# Patient Record
(demographics unavailable — no encounter records)

---

## 2017-01-17 NOTE — PAT MEDICATION INSTRUCTIONS
Service Date


Jan 17, 2017.





Current Home Medication List


Atorvastatin (Lipitor), 10 MG PO HS


B-Complex Vitamins (Vitamin B Complex), 1 TAB PO QAM


Calcium Carbonate-Vitamin D (Calcium + D), 1 TAB PO QAM


Cholecalciferol (Vitamin D), 4,000 INTER.UNIT PO QAM


Lisinopril (Prinivil), 10 MG PO QAM


Lorazepam (Ativan), 0.5 MG PO TID PRN for Anxiety


Metformin Hcl (Glucophage), 500 MG PO BID


Multivitamin (Multivitamin), 1 TAB PO QAM


Omeprazole (Prilosec), 20 MG PO HS


Verapamil Sust Rel (Calan Sr Ext Rel), 240 MG PO QAM


Vitamins C & E (Vitamin C), 1 CAP PO HS





Medication Instructions


For Your Scheduled Surgery 





- Hold the following medications 48 hours prior to surgery:


Metformin Hcl (Glucophage), 500 MG PO BID





- Hold the following medications the morning of surgery:


Multivitamin (Multivitamin), 1 TAB PO QAM


Lisinopril (Prinivil), 10 MG PO QAM


Calcium Carbonate-Vitamin D (Calcium + D), 1 TAB PO QAM


Cholecalciferol (Vitamin D), 4,000 INTER.UNIT PO QAM


B-Complex Vitamins (Vitamin B Complex), 1 TAB PO QAM





- Take the following medications the morning of surgery with a sip of water:


Verapamil Sust Rel (Calan Sr Ext Rel), 240 MG PO QAM


Lorazepam (Ativan), 0.5 MG PO TID PRN for Anxiety





- Take the following medications as scheduled the night before surgery:


Omeprazole (Prilosec), 20 MG PO HS


Lorazepam (Ativan), 0.5 MG PO TID PRN for Anxiety


Vitamins C (Vitamin C), 1 CAP PO HS


Atorvastatin (Lipitor), 10 MG PO HS





If you have any questions please call us at [phone] (Isha Salazar PA-C)

 or [phone] or [phone]

## 2017-01-23 NOTE — [facility] POST OPERATIVE BRIEF NOTE
Immediate Operative Summary


Operative Date


Jan 23, 2017.





Pre-Operative Diagnosis





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





Post-Operative Diagnosis





Same





Procedure(s) Performed





Bilateral Mastectomy and Left [place] Lymph Node Biopsy - Dr. Xavier 


Bilateral Breast Reconstruction with Adjustable Implants and Accellular Dermal 


Matrix - Dr. Pollard





Surgeon


Dr Xavier/ Dr Pollard





Assistant Surgeon(s)


Althea Costa PA-C





Estimated Blood Loss


50ML





Findings


See dictation





Specimens





A. Left breast stitch is lateral





B. [place] lymph node #1





C. [place] lymph node #2





D. [place] lymph node #3





E. Right breast stitch is lateral





Drains


4 J-P drains ( 2 per side)





Anesthesia


General





Complication(s)


None





Disposition


Recovery Room / PACU

## 2017-01-23 NOTE — DIAGNOSTIC IMAGING REPORT
LYMPHOSCINTIGRAPHY



CLINICAL HISTORY: Left breast cancer.



PROCEDURE: Using standard sterile technique, 4 intradermal and one deep

injection of 0.479 mCi of Lymphoseek was placed in the left breast. The patient

tolerated the procedure well. There were no immediate complications. The patient

was subsequently transported to the surgical suite. No imaging was obtained at

the referring physician's request. 



IMPRESSION: Injection of 0.479 mCi of Lymphoseek in the left breast.  







Electronically signed by:  Jarvis Dunn M.D.

1/23/2017 4:21 PM



Dictated Date/Time:  1/23/2017 4:20 PM

## 2017-01-23 NOTE — OPERATIVE REPORT
DATE OF OPERATION:  01/23/2017

 

PREOPERATIVE DIAGNOSIS:  Ductal carcinoma in situ left breast.

 

POSTOPERATIVE DIAGNOSIS:  Same.

 

PROCEDURE:  Stage immediate reconstruction following bilateral mastectomies

with tissue expanders and allograft.

 

SURGEON:  Rudy Pollard MD

 

ASSISTANTS:  Althea Costa PA-C and Ms. Wright, physician assistant student

student.

 

ANESTHESIA:  General endotracheal.

 

ESTIMATED BLOOD LOSS:  For plastic surgery, approximately 50 mL.

 

INTRAVENOUS FLUIDS AND URINE:  Refer to Dr. Xavier's note.

 

SPECIMENS:  None for plastic surgery.

 

APPARENT INTRAOPERATIVE COMPLICATIONS:  None.

 

CONDITION:  The patient's condition good.

 

DRAINS:  Two #10 flat Naveed-Avendano drains, one in the submuscular allograft

pocket via the subcutaneous.

 

HISTORY OF PRESENT ILLNESS:  The patient is a 52-year-old female referred by

Dr. Xavier for consultation for reconstruction.  She has DCIS extensively

on the left side.  She has elected to undergo bilateral mastectomy.  She is

now brought to the operating room for that purpose.  We will reconstruct her

with tissue expanders and allograft.  Dr. Xavier will perform bilateral

mastectomies and a sentinel node biopsy of the left side.

 

DESCRIPTION OF PROCEDURE:  In the preoperative holding area, I marked the

patient with her standing.  I marked the inframammary creases bilaterally. 

The anterior axillary lines were marked.  The patient was brought to the

operating room, where she was placed under general endotracheal anesthesia. 

The chest area was prepped and draped.  The [place] catheter was inserted.  A

timeout was held at the initial part of the procedure and I repeated this

during my part of the procedure.  Dr. Xavier and his team then proceeded

to perform the left mastectomy and sentinel node biopsies.  Upon completion,

I had the plastic surgery team entered the field.  Procedure commenced by

defining the inframammary crease.  Markings were made with a pen along the

inframammary crease.  After the subcutaneous tissue had been dissected off

the chest wall, which corresponded to the overlying inframammary crease

markings placed externally on the skin.  The edge of the soft tissue

internally was also marked to correspond to the inframammary crease.  A

submuscular pocket was then dissected underneath the pectoralis major muscle.

 It was  from the minor laterally.  The medial origin was taken down

from 6 o'clock to 9 o'clock.  There was a tear and the muscles were repaired

with interrupted 2-0 Vicryl sutures.  After this, a [place] CPX4 Tall Height

tissue expander was passed up the field.  On the left side, we used reference

#[id number], lot #[id number], serial #[id number].  It was tested for air leaks. 

Air was evacuated from the lumen.  Marker links were then made corresponding

with the tabs especially the one at 6 o'clock.  Next, a piece of AlloMax

surgical graft was brought in the field that had been soaked in 3 separate

bags of saline for 5 minutes each prior to use.  It was properly oriented

with a smooth side against the inside of the pocket and the rough side oriented 
towards the breast flap. The allograft was sutured into position by placing 2-0 
vicryl sutures medially suturing it to the pectoralis muscle and laterally to 
the pectoralis mucles and the chest wall.  Two sutures were then placed around 
the 6 o'clock position to either side where the

tab was to be placed.  In suturing along the inframammary crease, bites were

then taken to the allograft, the chest wall, and through the subcutaneous

tissue corresponding to the inframammary crease.  Excess tissue was then

trimmed at approximately 4 and 8 o'clock and more interrupted Vicryls were

placed.  This was then followed by running 2-0 Vicryls to either side of the

6 o'clock position again incorporating the inframammary crease were

appropriate and the anterior axillary line.  The interrupted 2-0 Vicryls were

then placed through the subcutaneous tissue and under the allograft through

the suture tab and then brought out.  This was done at 3, 6 and 9 o'clock. 

The expander was then carefully brought into the pocket and the pocket was

closed with running 2-0 Vicryl sutures, one starting medial plummer towards the

midline and the other lateral plummer towards the midline.  The pocket was then

inspected for bleeding and a #10 flat Naveed-Avendano drain had been placed

into the pocket and brought out through the stab wound in the anterior

axillary line.  A second drain was then placed inferior to the original one

and placed in the subcutaneous pocket along the inframammary crease.  Again

after hemostasis was achieved, the wound was irrigated with a triple

antibiotic solution consisting of cephalosporin, gentamicin, and bacitracin. 

Skin was then closed with buried 3-0 Monocryl dermal sutures, a running 3-0

Monocryl subcuticular suture, and Dermabond.  Drains were secured with 3-0

nylon sutures.  A similar procedure was carried out on the contralateral

side.  The allograft used was lot #[id number], reference #[id number], serial

#[id number], expiration date 2021-09.  The expander used was reference

#[id number], lot #[id number], serial #[id number].  The allograft used on the left

side was lot #[id number], reference #[id number], serial #[id number] expiration

date, 2021-09 .

 

 

I attest to the content of the Intraoperative Record and any orders documented 
therein. Any exceptions are noted below.

 

 Rudy Pollard MD FACS

 

[facility]

## 2017-01-23 NOTE — ANESTHESIOLOGY PROGRESS NOTE
Anesthesia Post Op Note


Date & Time


Jan 23, 2017 at 17:25





Vital Signs


Pain Intensity:  3





 Vital Signs Past 12 Hours








  Date Time  Temp Pulse Resp B/P Pulse Ox O2 Delivery O2 Flow Rate FiO2


 


1/23/17 17:14  71 13     


 


1/23/17 17:14  70 13  98   


 


1/23/17 17:13    134/77    


 


1/23/17 17:09  80 12     


 


1/23/17 17:09  80 12  98   


 


1/23/17 17:08    135/75    


 


1/23/17 17:04  69 10     


 


1/23/17 17:04  69 10  100   


 


1/23/17 17:03    137/71    


 


1/23/17 16:59  69 12  100   


 


1/23/17 16:59  70 12     


 


1/23/17 16:58    131/71    


 


1/23/17 16:54  73 13  100   


 


1/23/17 16:54  73 13     


 


1/23/17 16:53    130/74    


 


1/23/17 16:49  80 9  100   


 


1/23/17 16:49  80 9     


 


1/23/17 16:48    139/78    


 


1/23/17 16:44  84 11  100   


 


1/23/17 16:44  85 11     


 


1/23/17 16:43    143/79    


 


1/23/17 16:39  73 14     


 


1/23/17 16:39  79 14  100   


 


1/23/17 16:38    137/77    


 


1/23/17 16:34  68 15  100   


 


1/23/17 16:34  69 15     


 


1/23/17 16:33    137/76    


 


1/23/17 16:29  67 15  100   


 


1/23/17 16:29  67 15     


 


1/23/17 16:29  67 15  100   


 


1/23/17 16:29  67 15  100   


 


1/23/17 16:29  67 15     


 


1/23/17 16:29  67 15     


 


1/23/17 16:28    145/80    


 


1/23/17 16:28    145/80    


 


1/23/17 16:28    145/80    


 


1/23/17 16:24  69 14  100   


 


1/23/17 16:24  68 14     


 


1/23/17 16:24  69 14  100   


 


1/23/17 16:24  68 14     


 


1/23/17 16:24  69 14  100   


 


1/23/17 16:24  68 14     


 


1/23/17 16:23    140/78    


 


1/23/17 16:23    140/78    


 


1/23/17 16:23    140/78    


 


1/23/17 16:19  68 15     


 


1/23/17 16:19  69 15  100   


 


1/23/17 16:19  69 15  100   


 


1/23/17 16:19  68 15     


 


1/23/17 16:19  68 15     


 


1/23/17 16:19  69 15  100   


 


1/23/17 16:18    146/82    


 


1/23/17 16:18    146/82    


 


1/23/17 16:18    146/82    


 


1/23/17 16:14  80 15     


 


1/23/17 16:14  80 15     


 


1/23/17 16:14  81 15  100   


 


1/23/17 16:14  81 15  100   


 


1/23/17 16:14  81 15  100   


 


1/23/17 16:14  80 15     


 


1/23/17 16:13    143/83    


 


1/23/17 16:13    143/83    


 


1/23/17 16:13    143/83    


 


1/23/17 16:09  81 16 139/78 100 Mask 10 


 


1/23/17 16:09  95 15  100   


 


1/23/17 16:09  95 15  100   


 


1/23/17 16:09  76 15     


 


1/23/17 16:09  76 15     


 


1/23/17 16:09  76 15     


 


1/23/17 16:09  95 15  100   


 


1/23/17 16:08    148/77    


 


1/23/17 16:08    148/77    


 


1/23/17 16:08    148/77    


 


1/23/17 16:04  79 14     


 


1/23/17 16:04  79 14  100   


 


1/23/17 16:04  79 14     


 


1/23/17 16:04  79 14     


 


1/23/17 16:04  79 14  100   


 


1/23/17 16:04  79 14  100   


 


1/23/17 16:03    139/78    


 


1/23/17 16:03    139/78    


 


1/23/17 16:03    139/78    


 


1/23/17 15:59  84 16  100   


 


1/23/17 15:59  84 16     


 


1/23/17 15:59  84 16     


 


1/23/17 15:59  84 16  100   


 


1/23/17 15:59  84 16     


 


1/23/17 15:59 36.6 84 16 134/81 100 Mask 10 


 


1/23/17 15:59  84 16  100   


 


1/23/17 07:54 37 75 16 120/78 96 Room Air  











Notes


Mental Status:  alert / awake / arousable, participated in evaluation


Pt Amnestic to Procedure:  Yes


Nausea / Vomiting:  adequately controlled


Pain:  adequately controlled


Airway Patency, RR, SpO2:  stable & adequate


BP & HR:  stable & adequate


Hydration State:  stable & adequate


Anesthetic Complications:  no major complications apparent

## 2017-01-24 NOTE — SURGERY PROGRESS NOTE
Surgery Progress Note


Date of Service


Jan 24, 2017.





Subjective


Post OP Day:  1


No nausea, No vomiting


Having difficulty initiating urine stream





Objective


Vital Signs:











  Date Time  Temp Pulse Resp B/P Pulse Ox O2 Delivery O2 Flow Rate FiO2


 


1/24/17 11:42 36.7 78 16 138/75 95 Room Air  


 


1/24/17 08:59      Room Air  


 


1/24/17 08:00      Room Air  


 


1/24/17 07:41 37.5 78 16 143/85 96 Room Air  


 


1/24/17 02:54 37.0 73 16 168/76 96 Room Air  


 


1/24/17 00:14      Nasal Cannula 2.0 


 


1/23/17 23:12 36.8 74 16 127/66 99 Nasal Cannula 2.0 


 


1/23/17 21:38 36.9 83 18 146/87 100 Nasal Cannula 2.0 





      Humidified Oxygen  


 


1/23/17 20:18  72 16 128/83 99 Nasal Cannula 3.0 


 


1/23/17 19:30     100 Nasal Cannula 2.0 


 


1/23/17 19:11 36.7 72 16 127/83 95 Nasal Cannula 3.0 


 


1/23/17 18:32 36.6 72 16 131/82 98 Nasal Cannula 2.0 


 


1/23/17 18:00      Nasal Cannula 2.0 


 


1/23/17 17:33    127/80    


 


1/23/17 17:32 37.1 76 16 127/80 97 Nasal Cannula 2 


 


1/23/17 17:30  77 13  98   


 


1/23/17 17:30  77 13     


 


1/23/17 17:28    136/74    


 


1/23/17 17:25  87 10  97   


 


1/23/17 17:25  87 10     


 


1/23/17 17:23    140/87    


 


1/23/17 17:20  74 14  98   


 


1/23/17 17:20  74 14     


 


1/23/17 17:18    129/74    


 


1/23/17 17:15  86 11  97   


 


1/23/17 17:15  86 11     


 


1/23/17 17:14  71 13     


 


1/23/17 17:14  70 13  98   


 


1/23/17 17:13    134/77    


 


1/23/17 17:09  80 12     


 


1/23/17 17:09  80 12  98   


 


1/23/17 17:08    135/75    


 


1/23/17 17:04  69 10     


 


1/23/17 17:04  69 10  100   


 


1/23/17 17:03    137/71    


 


1/23/17 16:59  69 12  100   


 


1/23/17 16:59  70 12     


 


1/23/17 16:58    131/71    


 


1/23/17 16:54  73 13  100   


 


1/23/17 16:54  73 13     


 


1/23/17 16:53    130/74    


 


1/23/17 16:49  80 9  100   


 


1/23/17 16:49  80 9     


 


1/23/17 16:48    139/78    


 


1/23/17 16:44  84 11  100   


 


1/23/17 16:44  85 11     


 


1/23/17 16:43    143/79    


 


1/23/17 16:39  73 14     


 


1/23/17 16:39  79 14  100   


 


1/23/17 16:38    137/77    


 


1/23/17 16:34  68 15  100   


 


1/23/17 16:34  69 15     


 


1/23/17 16:33    137/76    


 


1/23/17 16:29  67 15  100   


 


1/23/17 16:29  67 15     


 


1/23/17 16:29  67 15  100   


 


1/23/17 16:29  67 15  100   


 


1/23/17 16:29  67 15     


 


1/23/17 16:29  67 15     


 


1/23/17 16:28    145/80    


 


1/23/17 16:28    145/80    


 


1/23/17 16:28    145/80    


 


1/23/17 16:24  69 14  100   


 


1/23/17 16:24  68 14     


 


1/23/17 16:24  69 14  100   


 


1/23/17 16:24  68 14     


 


1/23/17 16:24  69 14  100   


 


1/23/17 16:24  68 14     


 


1/23/17 16:23    140/78    


 


1/23/17 16:23    140/78    


 


1/23/17 16:23    140/78    


 


1/23/17 16:19  68 15     


 


1/23/17 16:19  69 15  100   


 


1/23/17 16:19  69 15  100   


 


1/23/17 16:19  68 15     


 


1/23/17 16:19  68 15     


 


1/23/17 16:19  69 15  100   


 


1/23/17 16:18    146/82    


 


1/23/17 16:18    146/82    


 


1/23/17 16:18    146/82    


 


1/23/17 16:14  80 15     


 


1/23/17 16:14  80 15     


 


1/23/17 16:14  81 15  100   


 


1/23/17 16:14  81 15  100   


 


1/23/17 16:14  81 15  100   


 


1/23/17 16:14  80 15     


 


1/23/17 16:13    143/83    


 


1/23/17 16:13    143/83    


 


1/23/17 16:13    143/83    


 


1/23/17 16:09  81 16 139/78 100 Mask 10 


 


1/23/17 16:09  95 15  100   


 


1/23/17 16:09  95 15  100   


 


1/23/17 16:09  76 15     


 


1/23/17 16:09  76 15     


 


1/23/17 16:09  76 15     


 


1/23/17 16:09  95 15  100   


 


1/23/17 16:08    148/77    


 


1/23/17 16:08    148/77    


 


1/23/17 16:08    148/77    


 


1/23/17 16:04  79 14     


 


1/23/17 16:04  79 14  100   


 


1/23/17 16:04  79 14     


 


1/23/17 16:04  79 14     


 


1/23/17 16:04  79 14  100   


 


1/23/17 16:04  79 14  100   


 


1/23/17 16:03    139/78    


 


1/23/17 16:03    139/78    


 


1/23/17 16:03    139/78    


 


1/23/17 15:59  84 16  100   


 


1/23/17 15:59  84 16     


 


1/23/17 15:59  84 16     


 


1/23/17 15:59  84 16  100   


 


1/23/17 15:59  84 16     


 


1/23/17 15:59 36.6 84 16 134/81 100 Mask 10 


 


1/23/17 15:59  84 16  100   








Physical Exam:  TENZIN drainage (10-40 cc per drain last shift, serosnaguinous)


Abdomen:  no pulsatile mass


Incision(s):  dry, intact, no erythema, no drainage, findings (No evidence of 

hematoma)


Laboratory Results:





Results Past 24 Hours








Test


  1/23/17


16:03 1/24/17


08:32 Range/Units


 


 


Bedside Glucose 121 150 70-90  mg/dl











Assessment & Plan


S/P bilateral mastectomy with reconstruction


Pain 5-9, fairly well conrolled


Would keep patient at [facility] more day for pain control and for difficulty 

voiding

## 2017-01-24 NOTE — OPERATIVE REPORT
DATE OF OPERATION:  01/23/2017

 

PREOPERATIVE DIAGNOSIS:  Extensive DCIS, left breast.

 

POSTOPERATIVE DIAGNOSIS:  Same.

 

PROCEDURE:  Bilateral mastectomy with left axillary sentinel lymph node

biopsy as well as bilateral reconstruction.  The reconstruction portion will

be dictated by Dr. Pollard.

 

FINDINGS:  The biopsy cavities were not entered.  There was no gross mass on

either side.  There were 3 sentinel lymph nodes that were biopsied.  The

first sentinel lymph node had an in-vivo count of 84 with an ex-vivo count of

654.  The sentinel lymph node #2 had an in-vivo count of 111 with an ex-vivo

count of 208 and sentinel lymph node #3 had an in-vivo count of 65 with an

ex-vivo count of 142.  None of the sentinel lymph nodes was blue.

 

TECHNIQUE:  The patient had the breasts marked in the holding area.  She was

then taken to the operating room and placed on the operating table,

positioned and the area was prepped and draped in the usual sterile fashion. 

The Neoprobe was used to identify the fact that a sentinel node was present

in the left axilla.  The incisions had been previously marked.  The superior

portion of the elliptical incision on the left breast was made, carried down

through to the subcutaneous tissue.  The superior flap was then created,

using cautery and dissecting the breast tissue off the overlying subcutaneous

tissue up to the level of the clavicle.  This was dissected then from medial

to lateral, working from the lateral border of the sternum out to beyond the

lateral border of pectoralis major.  The inferior portion of the incision was

created and carried down through to the subcutaneous tissue and an inferior

flap was created in a similar fashion down to the superior aspect of the

rectus fascia.  Once that was accomplished, I then performed the lateral

dissection, which then allowed me to enter the left axilla.  The Neoprobe was

then used to identify the 3 sentinel nodes.  They were dissected and once

they were identified, they were  from the surrounding tissues, using

the LigaSure device.  The baseline counts were then 56 as the highest.  The

mastectomy was then completed.  Beginning on the medial aspect, the

pectoralis major fascia was opened and then working along the superior edge

of the dissection of the skin flap, it was opened there, as well, exposing

the anterior surface of pectoralis major.  I then performed a similar

transection of the fascia inferiorly and then peeled the fascia off the

underlying pectoralis major muscle.  This was performed, working medial to

lateral.  Hemostasis was obtained, all times using cautery or using the

LigaSure for perforating vessels.  Once the pectoralis major lateral border

was cleared, I then completed the lateral dissection and removed the breast. 

The breast was marked with a stitch on the lateral corner of the skin. 

Meticulous hemostasis was then obtained.  The reconstruction on that side was

performed by Dr. Pollard, who will dictate that portion of the procedure. 

Attention was then turned to the right breast.  The elliptical incision had

been created.  It was the superior portion of that elliptical incision. 

Incision was made, carried down to the subcutaneous tissue and a superior

skin flap was created, maintaining adequate thickness, but dissecting along

the border of the subcutaneous tissue up to the clavicle, working towards the

lateral border of pectoralis major as well as medial, towards the lateral

border of the sternum.  The inferior portion of the incision was then made

and carried down to the subcutaneous tissue and an inferior flap was then

created down to beyond the inferior breast crease to the rectus fascia.  The

pectoralis major fascia was divided along the superior edge of the superior

skin flap and then, that dissection was carried medially and then inferiorly

along the sternal border and then the pectoralis major was peeled off the

underlying pectoralis major muscle, completing the transection of the fascia

on the inferior skin flap edge as well.  Once the lateral border of the

pectoralis major was freed, the lateral dissection was carried out to assure

inclusion of the tail of the breast on that side as well and removed.  It was

marked with a stitch on the lateral corner of the skin.  Hemostasis was

obtained.  That breast was then reconstructed and that will be dictated by

Dr. Pollard.

 

Estimated blood loss was 50 mL.  Sponge, needle, and instrument counts were

correct prior to closure.  The patient tolerated surgical procedure without

complication and was transferred to recovery.

 

 

I attest to the content of the Intraoperative Record and any orders documented therein. Any exceptio
ns are noted below.

## 2017-01-24 NOTE — ANESTHESIOLOGY PROGRESS NOTE
Anesthesia Post Op Note


Date & Time


Jan 24, 2017 at 09:59


 


 (Dwayne Underwood CRNA)





Vital Signs





 Vital Signs Past 12 Hours








  Date Time  Temp Pulse Resp B/P Pulse Ox O2 Delivery O2 Flow Rate FiO2


 


1/24/17 08:59      Room Air  


 


1/24/17 07:41 37.5 78 16 143/85 96 Room Air  


 


1/24/17 02:54 37.0 73 16 168/76 96 Room Air  


 


1/24/17 00:14      Nasal Cannula 2.0 


 


1/23/17 23:12 36.8 74 16 127/66 99 Nasal Cannula 2.0 








 (Yasmine, Ross E., CRNA)





Notes


Mental Status:  alert / awake / arousable, participated in evaluation


Pt Amnestic to Procedure:  Yes


Nausea / Vomiting:  adequately controlled


Pain:  adequately controlled


Airway Patency, RR, SpO2:  stable & adequate


BP & HR:  stable & adequate


Hydration State:  stable & adequate


Anesthetic Complications:  no major complications apparent


 (Dwayne Underwood CRNA)


        The patient feels well today other than some discomfort at the surgical 

site.  She states that her R hand tingling has completely resolved. She has no 

complaints and stated that she has been very happy with her care. 


 (Yarelis Ash MD)

## 2017-01-25 NOTE — SURGERY PROGRESS NOTE
Surgery Progress Note


Date of Service


Jan 25, 2017.





Subjective


Post OP Day:  2


+ diet, No bowel movement, No nausea, No vomiting


Still having pain, was still unable to void yesterday, Menon placed





Objective


Vital Signs:











  Date Time  Temp Pulse Resp B/P Pulse Ox O2 Delivery O2 Flow Rate FiO2


 


1/24/17 23:00 37.3 75 16 146/66 98 Nasal Cannula 2.0 





      Humidified Oxygen  


 


1/24/17 22:45      Nasal Cannula 2.0 


 


1/24/17 19:45      Nasal Cannula 2.0 


 


1/24/17 15:48 37.0 90 18 107/66 98 Nasal Cannula 2.0 


 


1/24/17 11:42 36.7 78 16 138/75 95 Room Air  


 


1/24/17 08:59      Room Air  


 


1/24/17 08:00      Room Air  


 


1/24/17 07:41 37.5 78 16 143/85 96 Room Air  








Incision(s):  clean, dry, intact, no erythema, no drainage


Laboratory Results:





Results Past 24 Hours








Test


  1/24/17


08:32 Range/Units


 


 


Bedside Glucose 150 70-90  mg/dl











Assessment & Plan


S/P bilateral mastectomy with reconstruction


Pain continues, will increase pain medication


Would keep patient at least one more day for pain control and for difficulty 

voiding


Needs continuing antibiotics while drains are in place

## 2017-01-26 NOTE — SURGERY PROGRESS NOTE
Surgery Progress Note


Date of Service


Jan 26, 2017.





Subjective


+ diet (tolerating diet), + pain controlled (A little less today), No bowel 

movement (despite MOM), No nausea, No vomiting





Objective


Vital Signs:











  Date Time  Temp Pulse Resp B/P Pulse Ox O2 Delivery O2 Flow Rate FiO2


 


1/26/17 15:49 36.6 71 16 140/84 94 Room Air  


 


1/26/17 08:25 36.8 81 16 115/72 92 Room Air  


 


1/26/17 08:00     97 Room Air  


 


1/25/17 23:45      Room Air  


 


1/25/17 23:40 36.8 87 18 143/77 91 Room Air  








Physical Exam:  TENZIN drainage (15-95 cc per day, serous)


Incision(s):  clean, dry, intact, no erythema, no drainage


Laboratory Results:





Results Past 24 Hours








Test


  1/26/17


08:32 Range/Units


 


 


Bedside Glucose 123 70-90  mg/dl











Assessment & Plan


S/P bilateral mastectomy with reconstruction


Pain improved a little today


Would keep patient at least one more day for pain control, difficulty voiding 

and constipation


Will order suppository and Miralax


Needs continuing antibiotics while drains are in place

## 2017-01-26 NOTE — DISCHARGE INSTRUCTIONS
Discharge Instructions


Admission


Reason for Admission:  Left Breast Ductal Carcinoma In Situ





Discharge


Discharge Diagnosis / Problem:  Same





Discharge Goals


Goal(s):  Improve disease control





Activity Recommendations


Activity Limitations:  per Instructions/Follow-up section


Lifting Limitations:  no more than 10 pounds


Shower/Bathe:  keep incision dry (drain sites)





.





Instructions / Follow-Up


Instructions / Follow-Up


Follow plastic surgery instructions





Empty and record drains





Place antibiotic ointment to drain sites





Current Hospital Diet


Patient's current hospital diet: Regular Diet, Diabetes Type 2 Diet





Discharge Diet


Recommended Diet:  Diabetes Type 2 Diet





Procedures


Procedures Performed:  


Bilateral Mastectomy and Left [place] Lymph Node Biopsy - Dr. Xavier 


Bilateral Breast Reconstruction with Adjustable Implants and Accellular Dermal 


Matrix - Dr. Pollard





Pending Studies


Studies pending at discharge:  no





Medical Emergencies








.


Who to Call and When:





Medical Emergencies:  If at any time you feel your situation is an emergency, 

please call 911 immediately.





.





Non-Emergent Contact


Non-Emergency issues call your:  Primary Care Provider, Surgeon


Call Non-Emergent contact if:  your pain is worsening, your pain is unusual for 

you, wound has increased redness, wound has increased pain


.





"Provider Documentation" section prepared by Neil Xavier.





VTE Core Measure


Inpt VTE Proph given/why not?:  Treatment not indicated

## 2017-01-27 NOTE — SURGERY PROGRESS NOTE
Surgery Progress Note


Date of Service


Jan 27, 2017.





Subjective


Post OP Day:  4


+ bowel movement, + flatus, No nausea, No vomiting


Feeling better 


Less pain today





Objective


Vital Signs:











  Date Time  Temp Pulse Resp B/P Pulse Ox O2 Delivery O2 Flow Rate FiO2


 


1/27/17 07:24 36.7 71 17 129/83 93 Room Air  


 


1/26/17 23:50 36.8 75 16 138/78 90 Room Air  


 


1/26/17 19:25      Room Air  


 


1/26/17 15:49 36.6 71 16 140/84 94 Room Air  


 


1/26/17 08:25 36.8 81 16 115/72 92 Room Air  


 


1/26/17 08:00     97 Room Air  








Physical Exam:  TENZIN drainage (8-50 cc per shift)


Abdomen:  non tender, non distended, soft


Incision(s):  clean, dry, intact, no erythema, no drainage


Laboratory Results:





Results Past 24 Hours








Test


  1/26/17


08:32 Range/Units


 


 


Bedside Glucose 123 70-90  mg/dl











Assessment & Plan


S/P bilateral mastectomy with reconstruction


Pain improved much more today


Had bm


Will D/C Tubbs, if patient still unable to void can replace Tubbs for patient 

to return home with


Can D/C to home, follow up next week


Needs continuing antibiotics while drains are in place

## 2017-01-30 NOTE — DISCHARGE SUMMARY
PRINCIPAL DIAGNOSIS:  Ductal carcinoma in situ, left breast.  Pathology on

the specimen remains pending.

 

SECONDARY DIAGNOSES:  Dyslipidemia, gastroesophageal reflux disease,

hypertension and diabetes type 2.

 

PRINCIPAL PROCEDURE:  Bilateral mastectomy with left axillary sentinel lymph

node biopsy and bilateral reconstruction using tissue expanders and

allograft.

 

SECONDARY PROCEDURES:  None.

 

CONSULTATIONS:  None.

 

HISTORY AND PHYSICAL:  As per H\T\P on the chart with no additions or

deletions.  Briefly, this is a 52-year-old female who had calcifications in

the upper outer quadrant, for which she underwent initially a core biopsy and

then an excisional needle localized biopsy, followed by a reexcision. 

Diagnosis on pathology was DCIS that was fairly extensive.  Decision was made

to perform mastectomy.  The patient then chose for bilateral mastectomy.  She

did have a biopsy on the right side but for benign disease.

 

HOSPITAL COURSE:  She was taken to the operating room where the procedure

described above was performed.  The estimated blood loss was 50 mL. Four

Naveed-Avendano drains were placed.  She had significant discomfort that

required analgesia control; however, by postoperative day #4, she was feeling

much better and pain was under good control with oral analgesics.  The drains

were passing varying amounts of serosanguineous fluid.  There was no evidence

of wound infection or seroma or hematoma.  She also was having difficulty

with constipation and with passing her urine.  The constipation was treated

with varying regimens of cathartics and by postoperative day #3, she did have

a bowel movement.  She had the Menon catheter removed in the immediate

postoperative period and required straight catheterization.  A Menon was then

placed and her urine output was good.  The Menon was removed on the morning

of discharge and the patient voided spontaneously prior to discharge.  Her

discharge medications included atorvastatin, vitamin B, calcium and vitamin

D, Prinivil, Ativan, Glucophage, Prilosec, Vanessa-Colace, Calan and vitamin C. 

She was also given Percocet for pain.  Pathology is pending at the time of

this dictation.  She was to follow up with me in 2 weeks.  She was also

scheduled to follow up with Dr. Calvillo.  She is to take Keflex 4 times a day

while the drains are in place.

## 2017-08-31 NOTE — DIAGNOSTIC IMAGING REPORT
BILATERAL LOWER EXTREMITY VENOUS DOPPLER



CLINICAL HISTORY: Bilateral leg cramps.    



COMPARISON STUDY:  Left lower extremity venous Doppler March 26, 2010 



TECHNIQUE:  Sonography of the deep venous system of the bilateral lower

extremities was performed. Compression and augmentation were evaluated.



FINDINGS:  The bilateral common femoral, superficial femoral and popliteal veins

were compressible. Augmentation was normal. Flow was shown within the deep calf

vessels.



IMPRESSION: No evidence of deep venous thrombus within the bilateral lower

extremities.







Electronically signed by:  Med Pineda M.D.

8/31/2017 11:40 AM



Dictated Date/Time:  8/31/2017 11:39 AM

## 2018-01-17 NOTE — EMERGENCY ROOM VISIT NOTE
History


Report prepared by Jannet:  Matt Irvin


Under the Supervision of:  Dr. Rudy Briscoe M.D.


First contact with patient:  09:29


Chief Complaint:  ED VAG BLEEDING


Stated Complaint:  REF BY DR - HEAVY VAGINAL BLEEDING





History of Present Illness


The patient is a 53 year old female who presents to the Emergency Room with 

complaints of worsening vaginal bleeding beginning eight days ago. She 

estimates that she has been going though about one pad per hour starting today. 

She went through two pads in one hour today earlier today but then it slowed 

down.  The patient also complains of lightheadedness, fatigue, abdominal 

cramping, and back pain. She typically gets abdominal cramping and back pain 

with her menstrual cycle, and states that her pain feels typical. She denies 

any shortness of breath. The patient states that she is bleeding "bright red" 

blood. She states that she had some abnormal "spotting to light bleeding" three 

months ago (October 2017). She states that her most recent menstrual cycle 

prior to this was eight months ago. The patient had a uterine biopsy a few 

months ago for her bleeding, but specimens could not be obtained from all four 

quadrants due to her having large fibroids. She is scheduled for a surgical 

biopsy and DNC soon. She has not had blood work to examine her blood counts 

since her symptoms began. The patient notes that she has a history of breast 

cancer s/p double mastectomy and is going through reconstructive surgery. She 

also notes that she began experiencing "dark spots" in her vision yesterday as 

well as a headache for which she was seen by her eye doctor. She was told that 

her symptoms were likely an ocular migraine.





   Source of History:  patient


   Onset:  Eight days ago


   Position:  other (vagina)


   Symptom Intensity:  moderate


   Quality:  other (bleeding)


   Timing:  worsening


   Associated Symptoms:  + abdominal pain ("cramping"), + back pain, + fatigue, 

No SOB


Note:


Additional symptoms: lightheadedness.





Review of Systems


See HPI for pertinent positives & negatives. A total of 10 systems reviewed and 

were otherwise negative.





Past Medical & Surgical


Medical Problems:


(1) DCIS (ductal carcinoma in situ) of breast


(2) Dyslipidemia


(3) Pre-diabetes








Family History


No pertinent family history stated.





Social History


Smoking Status:  Former Smoker





Current/Historical Medications


Scheduled


Atorvastatin (Lipitor), 10 MG PO HS


B-Complex Vitamins (Vitamin B Complex), 1 TAB PO QAM


Cholecalciferol (D 2000), 4,000 UNITS PO DAILY


Lisinopril (Prinivil), 10 MG PO QAM


Metformin Hcl (Glucophage), 500 MG PO BID


Multivitamin (Multivitamin), 1 TAB PO QAM


Omeprazole (Prilosec), 20 MG PO HS


Verapamil Sust Rel (Calan Sr Ext Rel), 240 MG PO QAM





Allergies


Coded Allergies:  


     Acetazolamide (Verified  Allergy, Mild, PROFUSE SWEATING, DISORIENTED, 

TUNNEL VISION, 1/17/18)


 OCCURED AFTER EYE SURGERY


     Adhesives (Verified  Allergy, Mild, RASH, 1/17/18)


     Ciprofloxacin (Verified  Allergy, Mild, RASH, 1/17/18)


     Levofloxacin (Verified  Allergy, Mild, DISORIENTATION, 1/17/18)


Uncoded Allergies:  


     STERISTRIPS (Allergy, Unknown, RASH, SWELLING AT SITE, 5/31/16)


 PT STATES SHE USUALLY HAS TO TAKE THEM OFF EARLY





Physical Exam


Vital Signs











  Date Time  Temp Pulse Resp B/P (MAP) Pulse Ox O2 Delivery O2 Flow Rate FiO2


 


1/17/18 11:51  74 18 116/65 97   


 


1/17/18 11:32  74 18 116/65 97 Room Air  


 


1/17/18 08:58 36.7 57 20 148/89 98 Room Air  











Physical Exam





Constitutional: Vital signs reviewed.


Eyes: Pupils are equal round reactive to light.  Conjunctiva are noninjected.  


ENT: Pharynx is clear without erythema or exudate.  Mucous membranes are moist.

  Neck supple without meningeal signs.


Respiratory: Clear to auscultation bilaterally.  Breath sounds are equal 

bilaterally. 


Cardiovascular: Regular rate and rhythm.  No rubs or gallops.


GI: Soft, nondistended and nontender.  Bowel sounds are present.


Musculoskeletal: No peripheral edema.  No lower extremity tenderness. 


Pelvic Exam: Small amount of dark blood in vaginal vault with a large clot. No 

active bleeding, or bright red blood. 


Integumentary: No cyanosis.


Neurological: The patient is awake and alert.  No focal deficits.


Psychiatric: Normal affect.





Medical Decision & Procedures


Laboratory Results


1/17/18 09:50








Red Blood Count 4.52, Mean Corpuscular Volume 88.9, Mean Corpuscular Hemoglobin 

30.1, Mean Corpuscular Hemoglobin Concent 33.8, Mean Platelet Volume 10.8, 

Neutrophils (%) (Auto) 57.5, Lymphocytes (%) (Auto) 34.4, Monocytes (%) (Auto) 

6.5, Eosinophils (%) (Auto) 1.1, Basophils (%) (Auto) 0.3, Neutrophils # (Auto) 

3.54, Lymphocytes # (Auto) 2.12, Monocytes # (Auto) 0.40, Eosinophils # (Auto) 

0.07, Basophils # (Auto) 0.02





1/17/18 09:50

















Test


  1/17/18


09:50


 


White Blood Count


  6.16 K/uL


(4.8-10.8)


 


Red Blood Count


  4.52 M/uL


(4.2-5.4)


 


Hemoglobin


  13.6 g/dL


(12.0-16.0)


 


Hematocrit 40.2 % (37-47) 


 


Mean Corpuscular Volume


  88.9 fL


()


 


Mean Corpuscular Hemoglobin


  30.1 pg


(25-34)


 


Mean Corpuscular Hemoglobin


Concent 33.8 g/dl


(32-36)


 


Platelet Count


  259 K/uL


(130-400)


 


Mean Platelet Volume


  10.8 fL


(7.4-10.4)


 


Neutrophils (%) (Auto) 57.5 % 


 


Lymphocytes (%) (Auto) 34.4 % 


 


Monocytes (%) (Auto) 6.5 % 


 


Eosinophils (%) (Auto) 1.1 % 


 


Basophils (%) (Auto) 0.3 % 


 


Neutrophils # (Auto)


  3.54 K/uL


(1.4-6.5)


 


Lymphocytes # (Auto)


  2.12 K/uL


(1.2-3.4)


 


Monocytes # (Auto)


  0.40 K/uL


(0.11-0.59)


 


Eosinophils # (Auto)


  0.07 K/uL


(0-0.5)


 


Basophils # (Auto)


  0.02 K/uL


(0-0.2)


 


RDW Standard Deviation


  42.5 fL


(36.4-46.3)


 


RDW Coefficient of Variation


  13.1 %


(11.5-14.5)


 


Immature Granulocyte % (Auto) 0.2 % 


 


Immature Granulocyte # (Auto)


  0.01 K/uL


(0.00-0.02)


 


Prothrombin Time


  9.7 SECONDS


(9.0-12.0)


 


Prothromb Time International


Ratio 0.9 (0.9-1.1) 


 


 


Activated Partial


Thromboplast Time 24.4 SECONDS


(21.0-31.0)


 


Partial Thromboplastin Ratio 0.9 


 


Anion Gap


  5.0 mmol/L


(3-11)


 


Est Creatinine Clear Calc


Drug Dose 75.8 ml/min 


 


 


Estimated GFR (


American) 84.6 


 


 


Estimated GFR (Non-


American 73.0 


 


 


BUN/Creatinine Ratio 16.3 (10-20) 


 


Calcium Level


  9.4 mg/dl


(8.5-10.1)





Laboratory results as reviewed by me.





ED Course


0930: The patient was evaluated in room B6. A complete history and physical 

exam was performed.





1040: I reassessed the patient. Her vaginal bleeding has slowed down 

significantly. 





1150: Upon reevaluation, the patient appeared to have improvement of her 

symptoms. I discussed tonight's findings with the patient. She verbalized 

agreement of the treatment plan. The patient was discharged home.





Medical Decision


This is a 53-year-old female presents with vaginal bleeding.  Differential 

diagnosis includes fibroids, endometrial cancer, anemia, polyp, dysfunctional 

uterine bleeding.  I did perform a limited focused review of portions of the 

patient's old chart on the electronic medical record. The patient has had no 

recent pertinent visits to this hospital.





I did evaluate the patient as noted above.  I printed perform a pelvic exam as 

noted above.  She does not have any significant active bleeding at this time.  

IV access was established.   I did order and review the patient's blood work as 

noted in the electronic medical record.  She is not anemic.  I did discuss the 

case with the patient's gynecologist.  She stated that the patient had an 

appointment later today at 3 PM and that she will see her then.  I did discuss 

this with the patient.  The patient was discharged and will follow up at 3 PM 

with Dr. Odonnell.





Medication Reconcilliation


Current Medication List:  was personally reviewed by me





Blood Pressure Screening


Patient's blood pressure:  Elevated blood pressure


Blood pressure disposition:  Referred to PCP





Consults


Time Called:  1142


Consulting Physician:  Dr. Odonnell -GYN


Returned Call:  [phone]


I spoke with Dr. Odonnell of Gynecology. We discussed the patient and her 

results. Dr. Odonnell notes that the patient already has an appointment to 

see him today, and will plan to see the patient during this time.





Impression





 Primary Impression:  


 Dysfunctional uterine bleeding





Scribe Attestation


The scribe's documentation has been prepared under my direct and personally 

reviewed by me in its entirety. I confirm that the note above accurately 

reflects all work, treatment, procedures, and medical decision making performed 

by me.





Departure Information


Dispostion


Home / Self-Care





Referrals


ELVA Andersen M.D. (MEDICAL) (PCP)





Forms


HOME CARE DOCUMENTATION FORM,                                                 

               IMPORTANT VISIT INFORMATION, WORK / SCHOOL INSTRUCTIONS





Patient Instructions


My [facility]





Additional Instructions





You have been examined and treated today on an emergency basis only. This is 

not a substitute for, or an effort to provide, complete comprehensive medical 

care. It is impossible to recognize and treat all injuries or illnesses in a 

single emergency department visit. It is therefore important that you follow up 

closely with Dr. Odonnell today at 3 PM per your appointment.  Return for 

worsening symptoms or if you develop feeling like you're going to pass out, 

chest pain, shortness breath or any other concerning symptoms.

## 2018-01-23 NOTE — HISTORY AND PHYSICAL
History & Physical


Date & Time of Service:


2018 at 16:23


Chief Complaint:


Heavy Uterine Bleeding, Legs Shaky


Primary Care Physician:


ELVA Andersen M.D. (MEDICAL)


History of Present Illness


Source:  patient


Patient is a 54 y/o  who comes in to the ER with heavy vaginal bleeding 

that has been on going since . She states she has a known enlarged 

fibroid uterus and is actually scheduled for a hysterectomy with Dr. Odonnell 

on 18. She has had an endometrial biopsy in December which was benign. She 

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 

breast and has undergone bilateral mastectomy. She was began on tamoxifen in 

2017 but ended up stopping due to heavy bleeding. Progestins and 

combination therapy are contraindicated. On today's admission her Hemoglobin is 

at 11.4 down from 13.6 on 18. ER physician completed a pelvic exam which 

states there were multiple clots removed but no active excessive bleeding seen. 

Although her H&H are tolerable she remains symptomatic feeling dizzy and 

lightheaded upon standing and ambulating. Discussed with her the plan to admit 

her for observation and to transfuse her 2 units of PRBC which she is agreeable 

to. She will follow up with Dr. Odonnell when she is discharged.





Past Medical/Surgical History


Medical Problems:


(1) Dyslipidemia


Status: Chronic  





(2) Pre-diabetes


Status: Chronic  











Social History


Smoking Status:  Former Smoker





Multi-Drug Resistant Organisms


History of MDRO:  No





Allergies


Coded Allergies:  


     Acetazolamide (Verified  Allergy, Mild, PROFUSE SWEATING, DISORIENTED, 

TUNNEL VISION, 18)


 OCCURED AFTER EYE SURGERY


     Adhesives (Verified  Allergy, Mild, RASH, 18)


     Ciprofloxacin (Verified  Allergy, Mild, RASH, 18)


     Levofloxacin (Verified  Allergy, Mild, DISORIENTATION, 18)


Uncoded Allergies:  


     STERISTRIPS (Allergy, Unknown, RASH, SWELLING AT SITE, 16)


 PT STATES SHE USUALLY HAS TO TAKE THEM OFF EARLY





Home Medications


Scheduled


Atorvastatin (Lipitor), 10 MG PO HS


B-Complex Vitamins (Vitamin B Complex), 1 TAB PO QAM


Cholecalciferol (D 2000), 4,000 UNITS PO DAILY


Lisinopril (Prinivil), 10 MG PO QAM


Metformin Hcl (Glucophage), 500 MG PO BID


Multivitamin (Multivitamin), 1 TAB PO QAM


Omeprazole (Prilosec), 20 MG PO HS


Verapamil Sust Rel (Calan Sr Ext Rel), 240 MG PO QAM





Review of Systems


Constitutional:  + fatigue


Respiratory:  No cough, No sputum, No wheezing, No shortness of breath, No 

dyspnea on exertion, No dyspnea at rest, No hemoptysis, No problem reported


Cardiovascular:  No chest pain, No orthopnea, No PND, No edema, No claudication

, No palpitations, No problem reported


Abdomen:  No pain, No nausea, No vomiting, No diarrhea, No constipation, No GI 

bleeding, No problem reported


Genitourinary - Female:  + menorrhagia


Hematologic / Lymphatic:  + abnormal bleeding/bruising





Physical Exam


Vital Signs











  Date Time  Temp Pulse Resp B/P (MAP) Pulse Ox O2 Delivery O2 Flow Rate FiO2


 


18 15:10  77 15 118/70 96 Room Air  


 


18 14:05  75 12 112/70 98 Room Air  


 


18 13:36  77 18 145/78 99 Room Air  


 


18 12:25  71      


 


18 10:31 36.8 92 18 158/91 99 Room Air  








General Appearance:  WD/WN, no apparent distress


Respiratory/Chest:  chest non-tender, lungs clear


Cardiovascular:  regular rate, rhythm


Abdomen/GI:  normal bowel sounds, non tender, soft


Extremities/Musculoskelatal:  normal inspection, normal range of motion


Neurologic/Psych:  alert, oriented x 3


Skin:  normal color, warm/dry, no rash





Diagnostics


Laboratory Results





Results Past 24 Hours








Test


  18


11:20 18


11:31 Range/Units


 


 


Urine Color RED   


 


Urine Appearance CLOUDY  CLEAR  


 


Urine pH 6.0  4.5-7.5  


 


Urine Specific Gravity 1.010  1.000-1.030  


 


Urine Protein 2+  NEG  


 


Urine Glucose (UA) NEG  NEG  


 


Urine Ketones NEG  NEG  


 


Urine Occult Blood 3+  NEG  


 


Urine Nitrite NEG  NEG  


 


Urine Bilirubin NEG  NEG  


 


Urine Urobilinogen NEG  NEG  


 


Urine Leukocyte Esterase NEG  NEG  


 


Urine RBC >30  0-4  /hpf


 


Urine WBC 1-5  0-5  /hpf


 


Urine Epithelial Cells >30  0-5  /lpf


 


Urine Bacteria NEG  NEG  


 


White Blood Count  6.07 4.8-10.8  K/uL


 


Red Blood Count  3.82 4.2-5.4  M/uL


 


Hemoglobin  11.4 12.0-16.0  g/dL


 


Hematocrit  33.8 37-47  %


 


Mean Corpuscular Volume  88.5   fL


 


Mean Corpuscular Hemoglobin  29.8 25-34  pg


 


Mean Corpuscular Hemoglobin


Concent 


  33.7


  32-36  g/dl


 


 


Platelet Count  279 130-400  K/uL


 


Mean Platelet Volume  10.4 7.4-10.4  fL


 


Neutrophils (%) (Auto)  59.4  %


 


Lymphocytes (%) (Auto)  33.6  %


 


Monocytes (%) (Auto)  5.9  %


 


Eosinophils (%) (Auto)  0.7  %


 


Basophils (%) (Auto)  0.2  %


 


Neutrophils # (Auto)  3.61 1.4-6.5  K/uL


 


Lymphocytes # (Auto)  2.04 1.2-3.4  K/uL


 


Monocytes # (Auto)  0.36 0.11-0.59  K/uL


 


Eosinophils # (Auto)  0.04 0-0.5  K/uL


 


Basophils # (Auto)  0.01 0-0.2  K/uL


 


RDW Standard Deviation  42.7 36.4-46.3  fL


 


RDW Coefficient of Variation  13.4 11.5-14.5  %


 


Immature Granulocyte % (Auto)  0.2  %


 


Immature Granulocyte # (Auto)  0.01 0.00-0.02  K/uL


 


Prothrombin Time


  


  10.0


  9.0-12.0


SECONDS


 


Prothromb Time International


Ratio 


  1.0


  0.9-1.1  


 


 


Activated Partial


Thromboplast Time 


  26.3


  21.0-31.0


SECONDS


 


Partial Thromboplastin Ratio  1.0  


 


Sodium Level  137 136-145  mmol/L


 


Potassium Level  3.9 3.5-5.1  mmol/L


 


Chloride Level  105   mmol/L


 


Carbon Dioxide Level  24 21-32  mmol/L


 


Anion Gap  8.0 3-11  mmol/L


 


Blood Urea Nitrogen  13 7-18  mg/dl


 


Creatinine


  


  0.77


  0.60-1.20


mg/dl


 


Est Creatinine Clear Calc


Drug Dose 


  87.9


   ml/min


 


 


Estimated GFR (


American) 


  102.2


   


 


 


Estimated GFR (Non-


American 


  88.2


   


 


 


BUN/Creatinine Ratio  16.7 10-20  


 


Random Glucose  89 70-99  mg/dl


 


Calcium Level  9.2 8.5-10.1  mg/dl


 


Total Bilirubin  0.3 0.2-1  mg/dl


 


Direct Bilirubin  < 0.1 0-0.2  mg/dl


 


Aspartate Amino Transf


(AST/SGOT) 


  26


  15-37  U/L


 


 


Alanine Aminotransferase


(ALT/SGPT) 


  49


  12-78  U/L


 


 


Alkaline Phosphatase  71   U/L


 


Total Protein  7.6 6.4-8.2  gm/dl


 


Albumin  3.8 3.4-5.0  gm/dl


 


Lipase  163   U/L











Diagnostic Radiology


Pelvic US done at [facility] 17





Uterus: Uterus is considerably enlarged, measures 14.0 cm long 8.7 cm AP and 14 

cm transverse. Myometrium is 


heterogeneous in echogenicity. Three mixed echogenicity solid lesions are 

noted in the uterus. For example 


a 4.0 x 3.7 x 5.0 cm mass in the anterior uterus. A 2nd predominantly 

hyperechoic mass in the anterior 


uterus measures 6.5 x 2.3 x 5.1 cm compared to 4.7 x 2.6 x 5.4 cm. A 3rd solid 

mass in the right lateral 


uterus measures 6.6 x 5.1 x 6.5 cm compared to 7.1 x 5.0 x 6.1 cm on ultrasound 

2017, without 


significant change accounting for technical differences.


Endometrium: Segmentally visualized. Endometrium is thickened, measures 22 mm 

in thickness.





Right ovary is not visualized. Considerable bowel gas is seen in the right 

adnexa.


Left ovary: Multiple follicles/ cysts are noted. The largest cyst measures 3.2 

x 3.1 x 4.5 cm. The left 


ovary measures 7.5 x 3.7 x 5.6 cm with a calculated volume of 81 cc.





Cul-de-sac: No free fluid.








IMPRESSION


1. Enlarged uterus. Heterogeneous in echotexture myometrium and at least 3 

fibroids identified. Findings 


are suggestive of leiomyomatous uterus.


2. Thickened endometrium measuring up to 22 mm. Consider endometrial 

hyperplasia. Neoplastic etiology not 


excluded.


3. Enlarged left ovary with multiple follicles/ cysts. The dominant cyst 

measures 3.2 x 3.1 x 4.5 cm. 


Follow-up ultrasound in 10-12 weeks would be helpful.


4. Right ovary not visualized.


5. No free fluid .





Impression


Assessment and Plan





(1) Abnormal uterine bleeding


(2) HX: breast cancer


(3) Dyslipidemia


(4) Pre-diabetes


(5) Anemia due to acute blood loss


(6) Fibroid uterus


-Hemoglobin at 11.4, patient remains symptomatic upon ambulation, agreed to 2 

units PRBC, repeat H&H in AM. 


-Scheduled for hysterectomy on 18, will discuss with Dr. Odonnell about 

the possibility of moving her surgery sooner. 


-Restart home medications





Level of Care


Women's & Children's





VTE Prophylaxis


VTE Risk Assessment Done? Y/N:  Yes


Risk Level:  Low


Given or contraindicated:  SCD's





Note


Total Time:   Critical Care 30 - 74 minutes





Problem Qualifiers





(1) Fibroid uterus:  


Uterine leiomyoma location:  unspecified location  Qualified Codes:  D25.9 - 

Leiomyoma of uterus, unspecified

## 2018-01-23 NOTE — EMERGENCY ROOM VISIT NOTE
History


Report prepared by Jannet:  Ghanshyam Ramirez


Under the Supervision of:  Dr. Armen Rankin M.D.


First contact with patient:  10:56


Chief Complaint:  VAGINAL BLEEDING


Stated Complaint:  HEAVY UTERINE BLEEDING, LEGS SHAKY





History of Present Illness


The patient is a 53 year old female who presents to the Emergency Room with 

complaints of persistent vaginal bleeding that began 3 months ago. The patient 

states her current bleeding has worsened since its onset from October 28th, 2017 and has been saturating 8-10 pads daily. The patient complains of SOB. She 

has a history of breast cancer and had a double mastectomy in January 2017. She 

states that she has a hysterectomy scheduled with Dr. Radha Chance. The 

patient was seen in ED here last week and had blood suctioned from her vagina. 

Of note, her last dietary intake was 3 hours ago.


 


Pt denies LOC, headache, fevers, chills, diaphoresis, visual changes, neck pain

, chest pain, nausea, vomiting, back pain, diarrhea,  numbness, rash, or other 

complaints.





   Onset:  3 months ago


   Position:  pelvis


   Timing:  constant


   Associated Symptoms:  + SOB, No fevers, No chills, No nausea, No vomiting, 

No diarrhea


Note:


Patient complains of feeling lightheaded.





Review of Systems


See HPI for pertinent positives and negatives.  A total of ten systems were 

reviewed and were otherwise negative.





Past Medical & Surgical


Medical Problems:


(1) Anemia due to acute blood loss


(2) DCIS (ductal carcinoma in situ) of breast


(3) Dyslipidemia


(4) Fibroid uterus


(5) HX: breast cancer


(6) Pre-diabetes








Social History


Smoking Status:  Former Smoker





Current/Historical Medications


Scheduled


Atorvastatin (Lipitor), 10 MG PO HS


B-Complex Vitamins (Vitamin B Complex), 1 TAB PO QAM


Cholecalciferol (D 2000), 4,000 UNITS PO DAILY


Lisinopril (Prinivil), 10 MG PO QAM


Metformin Hcl (Glucophage), 500 MG PO BID


Multivitamin (Multivitamin), 1 TAB PO QAM


Omeprazole (Prilosec), 20 MG PO HS


Verapamil Sust Rel (Calan Sr Ext Rel), 240 MG PO QAM





Allergies


Coded Allergies:  


     Acetazolamide (Verified  Allergy, Mild, PROFUSE SWEATING, DISORIENTED, 

TUNNEL VISION, 1/23/18)


 OCCURED AFTER EYE SURGERY


     Adhesives (Verified  Allergy, Mild, RASH, 1/23/18)


     Ciprofloxacin (Verified  Allergy, Mild, RASH, 1/23/18)


     Levofloxacin (Verified  Allergy, Mild, DISORIENTATION, 1/23/18)


Uncoded Allergies:  


     STERISTRIPS (Allergy, Unknown, RASH, SWELLING AT SITE, 5/31/16)


 PT STATES SHE USUALLY HAS TO TAKE THEM OFF EARLY





Physical Exam


Vital Signs











  Date Time  Temp Pulse Resp B/P (MAP) Pulse Ox O2 Delivery O2 Flow Rate FiO2


 


1/23/18 15:10  77 15 118/70 96 Room Air  


 


1/23/18 14:05  75 12 112/70 98 Room Air  


 


1/23/18 13:36  77 18 145/78 99 Room Air  


 


1/23/18 12:25  71      


 


1/23/18 10:31 36.8 92 18 158/91 99 Room Air  











Physical Exam


GENERAL: Awake, alert, fatigued appearing, in no distress


HENT: Normocephalic, atraumatic. Dry mucous membranes.


EYES: Normal conjunctiva. Sclera non-icteric.


NECK: Supple. No nuchal rigidity. FROM. No JVD.


RESPIRATORY: Clear to auscultation.


CARDIAC: Regular rate, normal rhythm. Extremities warm and well perfused. 

Pulses equal.


ABDOMEN: Soft, non-distended. Mild lower abdominal tenderness, no peritoneal 

signs. No rebound or guarding. No masses.


RECTAL: Deferred.


MUSCULOSKELETAL: Chest examination reveals no tenderness. The back is 

symmetrical on inspection without obvious abnormality. There is no CVA 

tenderness to palpation. No joint edema. 


LOWER EXTREMITIES: Calves are equal size bilaterally and non-tender. No edema. 

No discoloration. 


NEURO: Normal sensorium. No sensory or motor deficits noted. 


SKIN: No rash or jaundice noted.


PELVIC: Moderate amount of dark blood in clots in vaginal canal. No active 

bleeding from cervix. No CMT or AT.





Medical Decision & Procedures


Laboratory Results


1/23/18 11:31








Red Blood Count 3.82, Mean Corpuscular Volume 88.5, Mean Corpuscular Hemoglobin 

29.8, Mean Corpuscular Hemoglobin Concent 33.7, Mean Platelet Volume 10.4, 

Neutrophils (%) (Auto) 59.4, Lymphocytes (%) (Auto) 33.6, Monocytes (%) (Auto) 

5.9, Eosinophils (%) (Auto) 0.7, Basophils (%) (Auto) 0.2, Neutrophils # (Auto) 

3.61, Lymphocytes # (Auto) 2.04, Monocytes # (Auto) 0.36, Eosinophils # (Auto) 

0.04, Basophils # (Auto) 0.01





1/23/18 11:31

















Test


  1/23/18


11:20 1/23/18


11:31


 


Urine Color RED  


 


Urine Appearance CLOUDY (CLEAR)  


 


Urine pH 6.0 (4.5-7.5)  


 


Urine Specific Gravity


  1.010


(1.000-1.030) 


 


 


Urine Protein 2+ (NEG)  


 


Urine Glucose (UA) NEG (NEG)  


 


Urine Ketones NEG (NEG)  


 


Urine Occult Blood 3+ (NEG)  


 


Urine Nitrite NEG (NEG)  


 


Urine Bilirubin NEG (NEG)  


 


Urine Urobilinogen NEG (NEG)  


 


Urine Leukocyte Esterase NEG (NEG)  


 


Urine RBC >30 /hpf (0-4)  


 


Urine WBC 1-5 /hpf (0-5)  


 


Urine Epithelial Cells >30 /lpf (0-5)  


 


Urine Bacteria NEG (NEG)  


 


White Blood Count


  


  6.07 K/uL


(4.8-10.8)


 


Red Blood Count


  


  3.82 M/uL


(4.2-5.4)


 


Hemoglobin


  


  11.4 g/dL


(12.0-16.0)


 


Hematocrit  33.8 % (37-47) 


 


Mean Corpuscular Volume


  


  88.5 fL


()


 


Mean Corpuscular Hemoglobin


  


  29.8 pg


(25-34)


 


Mean Corpuscular Hemoglobin


Concent 


  33.7 g/dl


(32-36)


 


Platelet Count


  


  279 K/uL


(130-400)


 


Mean Platelet Volume


  


  10.4 fL


(7.4-10.4)


 


Neutrophils (%) (Auto)  59.4 % 


 


Lymphocytes (%) (Auto)  33.6 % 


 


Monocytes (%) (Auto)  5.9 % 


 


Eosinophils (%) (Auto)  0.7 % 


 


Basophils (%) (Auto)  0.2 % 


 


Neutrophils # (Auto)


  


  3.61 K/uL


(1.4-6.5)


 


Lymphocytes # (Auto)


  


  2.04 K/uL


(1.2-3.4)


 


Monocytes # (Auto)


  


  0.36 K/uL


(0.11-0.59)


 


Eosinophils # (Auto)


  


  0.04 K/uL


(0-0.5)


 


Basophils # (Auto)


  


  0.01 K/uL


(0-0.2)


 


RDW Standard Deviation


  


  42.7 fL


(36.4-46.3)


 


RDW Coefficient of Variation


  


  13.4 %


(11.5-14.5)


 


Immature Granulocyte % (Auto)  0.2 % 


 


Immature Granulocyte # (Auto)


  


  0.01 K/uL


(0.00-0.02)


 


Prothrombin Time


  


  10.0 SECONDS


(9.0-12.0)


 


Prothromb Time International


Ratio 


  1.0 (0.9-1.1) 


 


 


Activated Partial


Thromboplast Time 


  26.3 SECONDS


(21.0-31.0)


 


Partial Thromboplastin Ratio  1.0 


 


Anion Gap


  


  8.0 mmol/L


(3-11)


 


Est Creatinine Clear Calc


Drug Dose 


  87.9 ml/min 


 


 


Estimated GFR (


American) 


  102.2 


 


 


Estimated GFR (Non-


American 


  88.2 


 


 


BUN/Creatinine Ratio  16.7 (10-20) 


 


Calcium Level


  


  9.2 mg/dl


(8.5-10.1)


 


Total Bilirubin


  


  0.3 mg/dl


(0.2-1)


 


Direct Bilirubin


  


  < 0.1 mg/dl


(0-0.2)


 


Aspartate Amino Transf


(AST/SGOT) 


  26 U/L (15-37) 


 


 


Alanine Aminotransferase


(ALT/SGPT) 


  49 U/L (12-78) 


 


 


Alkaline Phosphatase


  


  71 U/L


()


 


Total Protein


  


  7.6 gm/dl


(6.4-8.2)


 


Albumin


  


  3.8 gm/dl


(3.4-5.0)


 


Lipase


  


  163 U/L


()





Laboratory results reviewed by me





Medications Administered











 Medications


  (Trade)  Dose


 Ordered  Sig/Angelica


 Route  Start Time


 Stop Time Status Last Admin


Dose Admin


 


 Sodium Chloride  2,000 ml @ 


 999 mls/hr  Q2H1M STAT


 IV  1/23/18 11:01


 1/23/18 13:01 DC 1/23/18 12:02


999 MLS/HR


 


 Acetaminophen


  (Tylenol Tab)  1,000 mg  STK-MED ONCE


 PO  1/23/18 13:40


 1/23/18 13:41 DC 1/23/18 13:43


1,000 MG











ECG


Indication:  SOB/dyspnea


Rate (beats per minute):  77


Rhythm:  normal sinus


Findings:  no acute ischemic change, other (Normal axis)


Change:


EKG interpreted by me.





ED Course


1056: The patient was evaluated in room B11 B. A complete history and physical 

exam was performed.





1348: I spoke with Dr. Radha STOCKTON about patient management.





1542: I spoke with Dr. Oropeza about patient management and discussed that the 

patient had difficulty ambulating.





1545: Upon reexamination, the patient was doing better. I discussed the test 

results and treatment plan with the patient. The patient will be evaluated for 

further management.





Medical Decision


I reviewed the patient's past medical history, medications, and the nursing 

notes as described above.





The patient's presentation and history were concerning for malignancy, fibroids

, uterine polyps, endometriosis among others.





The patient is a 53-year-old woman with a past medical history of recent 

abnormal uterine bleeding setting of multiple uterine fibroids with plan for 

elective hysterectomy with Dr. Odonnell, [facility], in February now 

presents emergency Department with persistent bleeding and now with 

lightheadedness after being seen last week for similar symptoms per history of 

present illness.  Arrival the patient is no acute distress, afebrile with 

stable vital signs.  Hemoglobin 11.4 however down from 13.66 days PTA.  Pelvic 

exam demonstrates a moderate amount of dark blood and clots in the vaginal 

vault which once cleared show no evidence of active bleeding at this time.  

Case was discussed with the patient's GYN surgeon, Dr. Odonnell and we agreed 

with plan for admission if patient continued to be symptomatic upon ambulatory 

trial after IV fluid hydration, considering the patient's 2 point drop in 

hemoglobin in 6 days.  Subsequently, upon ambulation the patient continues to 

be symptomatic with lightheadedness and dyspnea despite stable vital signs.  

Thus case was discussed with [facility] on call, Dr. Canchola, who will admit 

the patient for further management.  Agrees with transfusion and ordered for 2 

units PRBCs.





Medication Reconcilliation


Current Medication List:  was personally reviewed by me





Blood Pressure Screening


Patient's blood pressure:  Elevated blood pressure


Blood pressure disposition:  Elevated BP felt to be situational





Consults


Time Called:  1346


Consulting Physician:  Dr. Radha Odonnell


Returned Call:  1342


I discussed the patient with Dr. Radha Odonnell - MAHIN will evaluate the 

patient for further treatment.


Additional Consults:  


   Time Called:  1540


   Consulted Physician:  Dr. Oropeza


   Returned Call:  1540


Additional Comments:


I spoke with Dr. Oropeza about patient management and discussed that the patient 

had difficulty ambulating.





Impression





 Primary Impression:  


 Abnormal vaginal bleeding





Critical Care


I have personally spent greater than 35 minutes of critical care time in the 

direct management of this patient.  This includes bedside care, interpretation 

of diagnostic studies, and testing, discussion with consultants, patient, and 

family members, and other required patient management activities.  This 35 

minutes is in excess of all separately billable procedures.





Scribe Attestation


The scribe's documentation has been prepared under my direction and personally 

reviewed by me in its entirety. I confirm that the note above accurately 

reflects all work, treatment, procedures, and medical decision making performed 

by me.





Departure Information


Referrals


ELVA Andersen M.D. (MEDICAL) (PCP)





Patient Instructions


My [facility]

## 2018-01-24 NOTE — PROGRESS NOTE
Progress Note


Date of Service


Jan 24, 2018.





Progress Note


Pt seen by Dr Domingo


doing well


plan is to disch pt

## 2018-01-24 NOTE — DISCHARGE INSTRUCTIONS
Discharge Instructions


Date of Service


Jan 24, 2018.





Admission


Reason for Admission:  Abnormal Uterine Bleeding, Anticoagulated





Discharge


Discharge Diagnosis / Problem:  menorrhagia





Discharge Goals


Goal(s):  Continuing GYN care





Activity Recommendations


Activity Limitations:  as noted below





ACTIVITY RECOMMENDATIONS:





*  Avoid tampons, douching, hot tubs, pools, and intercourse until bleeding has 

stopped.





*  May shower as usual.





*  No strenuous activity for 24-48 hours. After 24-48 hours, you can do 

anything you feel 


   like doing (driving and sports are okay).








RETURN TO SCHOOL/WORK:





*  You may return to school or work after 24 hours unless specified by your 

physician.








DIET:





*  Resume previous diet.








MEDICATIONS:





Resume previous medications unless instructed otherwise by your surgeon.





Ibuprofen 200mg 2-3 tablets every 4-6 hours as needed


    --OR--


Aleve 2 tablets every 8-12 hours as needed for post-operative discomfort





Medications are over the counter. Tylenol may be used if above medications


are contraindicated or not preferred. Medication should be taken with food or


milk. do not take on an empty stomach.








SPECIAL CARE INSTRUCTIONS:





*  Check temperature twice daily for one week. Report any elevation over 101 

degrees.





*  Call office if you experience increased pelvic pain or discomfort not 

relieved by pain


   medicine, if you have foul smelling vaginal discharge, if you have bleeding 

that is 


   heavier than a normal menstrual flow. If you are changing a maxi pad every 1-

2 hours,


   this is too heavy. vaginal spotting is normal for 1-2 weeks.








FOLLOW UP VISIT:





Call your doctor's office for a post-operative visit.








.





Current Hospital Diet


Patient's current hospital diet: Regular Diet





Discharge Diet


Recommended Diet:  Regular Diet





Pending Studies


Studies pending at discharge:  no





Medical Emergencies








.


Who to Call and When:





Medical Emergencies:  If at any time you feel your situation is an emergency, 

please call 911 immediately.





.





Non-Emergent Contact


Non-Emergency issues call your:  Specialist





.


.








"Provider Documentation" section prepared by Cornell Brooks.








.





VTE Core Measure


Inpt VTE Proph given/why not?:  SCD's, Treatment not indicated

## 2018-02-02 NOTE — DISCHARGE SUMMARY
HISTORY OF PRESENT ILLNESS:  This is a 53-year-old G2, P1, who was admitted

through the ER by Dr. Odonnell for heavy bleeding.  The patient has a known

history of fibroid uterus and was scheduled for hysterectomy by Dr. Odonnell on 02/16/2018.  Her bleeding had gotten heavy and the patient was,

therefore, admitted and given 2 units of packed RBC.  Hemoglobin on arrival

was 11.4 which was down from 13.6 on 01/17/2018.  The patient appeared

symptomatic.  She received 2 units of blood and was observed overnight.  Her

symptoms improved.

 

PAST MEDICAL HISTORY:

1.  History of fibroid uterus.

2.  Dyslipidemia.

 

PAST SURGICAL HISTORY:  None.

 

SOCIAL HISTORY:  The patient denies tobacco, drug or alcohol use.

 

MEDICATIONS:  The patient is on Cipro and levofloxacin.

 

REVIEW OF SYSTEMS:  Negative except as dictated in the HPI.

 

PHYSICAL EXAMINATION AT THE TIME OF DISCHARGE:

VITAL SIGNS:  On 01/24/2018, the patient was alert, awake and oriented x3. 

Temperature was 36.8, pulse 86, respirations 16, blood pressure 132/77.

HEART:  S1, S2, regular rhythm and rate.

LUNGS:  Clear to auscultation bilaterally.

ABDOMEN:  Nontender, nondistended, positive bowel sounds.

VAGINA:  Bleeding has remarkably improved.

EXTREMITIES:  No cyanosis, clubbing or edema.

 

LABORATORY DATA:  On 01/24/2018 showed hemoglobin of 12.1 and hematocrit of

35.7.

 

CONDITION ON DISCHARGE:  Stable.

 

RECOMMENDATIONS:  Observation.  Blood transfusion, 2 units of packed RBC.

 

DISCHARGE DIAGNOSES:

1.  Menorrhagia.

2.  Fibroid uterus.

 

PLAN ON DISCHARGE:  The patient will be discharged home with instructions

regarding activity, diet, followup appointments and medication.